# Patient Record
Sex: MALE | Race: WHITE | NOT HISPANIC OR LATINO
[De-identification: names, ages, dates, MRNs, and addresses within clinical notes are randomized per-mention and may not be internally consistent; named-entity substitution may affect disease eponyms.]

---

## 2020-05-14 ENCOUNTER — TRANSCRIPTION ENCOUNTER (OUTPATIENT)
Age: 55
End: 2020-05-14

## 2022-02-28 ENCOUNTER — NON-APPOINTMENT (OUTPATIENT)
Age: 57
End: 2022-02-28

## 2022-02-28 PROBLEM — Z00.00 ENCOUNTER FOR PREVENTIVE HEALTH EXAMINATION: Status: ACTIVE | Noted: 2022-02-28

## 2022-03-29 ENCOUNTER — NON-APPOINTMENT (OUTPATIENT)
Age: 57
End: 2022-03-29

## 2022-03-29 ENCOUNTER — APPOINTMENT (OUTPATIENT)
Dept: CARDIOLOGY | Facility: CLINIC | Age: 57
End: 2022-03-29
Payer: COMMERCIAL

## 2022-03-29 VITALS
HEIGHT: 70 IN | BODY MASS INDEX: 26.34 KG/M2 | OXYGEN SATURATION: 97 % | DIASTOLIC BLOOD PRESSURE: 70 MMHG | SYSTOLIC BLOOD PRESSURE: 120 MMHG | TEMPERATURE: 97.9 F | HEART RATE: 81 BPM | WEIGHT: 184 LBS

## 2022-03-29 DIAGNOSIS — R06.83 SNORING: ICD-10-CM

## 2022-03-29 PROCEDURE — 93000 ELECTROCARDIOGRAM COMPLETE: CPT

## 2022-03-29 PROCEDURE — 99203 OFFICE O/P NEW LOW 30 MIN: CPT

## 2022-03-29 RX ORDER — PSYLLIUM HUSK 0.4 G
CAPSULE ORAL
Refills: 0 | Status: ACTIVE | COMMUNITY

## 2022-03-29 RX ORDER — BIOTIN 10 MG
TABLET ORAL
Refills: 0 | Status: ACTIVE | COMMUNITY

## 2022-03-29 NOTE — DISCUSSION/SUMMARY
[With PCP] : with ~his/her~ primary care provider [FreeTextEntry1] : 57-year-old male with increasing dyspnea on exertion.  May be related to Covid pneumonia, myocarditis?  Will obtain echocardiogram for evaluation of left ventricular function and rule out pulmonary hypertension.  Also, risk stratification with exercise stress test.  Will obtain copies of most recent labs prior to testing from his primary care physician.  If elevated cholesterol, may require statin intervention.  Referred patient for pulmonary evaluation and sleep study, states he has seen Dr. Sabillon in the past.

## 2022-03-29 NOTE — HISTORY OF PRESENT ILLNESS
[FreeTextEntry1] : Generally healthy 57-year-old male with no significant past medical history.  Has been treated for hyperlipidemia in the past but is now off all medications.  He states that he came down with Covid pneumonia in early December, 2021 which was relatively mild but since then he has noticed progressively worsening dyspnea on minimal exertion.  He denies any chest pain, palpitations or syncopal episodes.  Works as an  and has noticed that when he goes up a flight of stairs he is quite winded.  He denies any cough, hemoptysis, chills or fever.  He does admit to significant issue of snoring.\par \par  with 3 children.  Nondrinker non-smoker.

## 2022-04-05 ENCOUNTER — APPOINTMENT (OUTPATIENT)
Dept: CARDIOLOGY | Facility: CLINIC | Age: 57
End: 2022-04-05
Payer: COMMERCIAL

## 2022-04-05 PROCEDURE — 93306 TTE W/DOPPLER COMPLETE: CPT

## 2023-03-14 ENCOUNTER — APPOINTMENT (OUTPATIENT)
Dept: CARDIOLOGY | Facility: CLINIC | Age: 58
End: 2023-03-14
Payer: COMMERCIAL

## 2023-03-14 ENCOUNTER — NON-APPOINTMENT (OUTPATIENT)
Age: 58
End: 2023-03-14

## 2023-03-14 VITALS
HEART RATE: 84 BPM | WEIGHT: 191 LBS | HEIGHT: 70 IN | BODY MASS INDEX: 27.35 KG/M2 | SYSTOLIC BLOOD PRESSURE: 120 MMHG | OXYGEN SATURATION: 98 % | TEMPERATURE: 98 F | DIASTOLIC BLOOD PRESSURE: 60 MMHG

## 2023-03-14 DIAGNOSIS — R12 HEARTBURN: ICD-10-CM

## 2023-03-14 PROCEDURE — 93000 ELECTROCARDIOGRAM COMPLETE: CPT

## 2023-03-14 PROCEDURE — 99214 OFFICE O/P EST MOD 30 MIN: CPT | Mod: 25

## 2023-03-14 RX ORDER — ATORVASTATIN CALCIUM 20 MG/1
20 TABLET, FILM COATED ORAL
Qty: 90 | Refills: 3 | Status: ACTIVE | COMMUNITY
Start: 2022-04-01 | End: 1900-01-01

## 2023-03-14 NOTE — HISTORY OF PRESENT ILLNESS
[FreeTextEntry1] : 58-year-old male with h/o hyperlipidemia in the past but has been somewhat non-compliant with meds.\ochoa Ryanochoa At his last visit, he stated that he had worsening dyspnea on minimal exertion since [id number] down with Covid pneumonia 3 months prior to his visit. He was scheduled to have a stress test (but couldn't find the time) and feels that his KENYON eventually resolved. Now he notices a heartburn like sensation after eating. He says he still has limited exercise tolerance in general. He denies any overt chest pain, palpitations or syncopal episodes.  \ochoa spear Works as an  and has noticed that when he goes up a flight of stairs he is quite winded.  He denies any cough, hemoptysis, chills or fever.  He does admit to significant issue of snoring and was given a CPAP device from Dr. Sabillon, but never even opened the box.\ochoa beatris  with 3 children.  Nondrinker non-smoker.

## 2023-03-14 NOTE — DISCUSSION/SUMMARY
[With PCP] : with ~his/her~ primary care provider [FreeTextEntry1] : 58-year-old male with continued dyspnea on exertion and heartburn like symptoms. Scheduled for risk stratification with exercise nuclear stress test.  No recent labs, off statin; lengthy discussion regarding need for compliance - will send labs .\par \par Continue f/u with pulmonary; spoke with patient about benefits of CPAp therapy.\par If cardiac eval negative, should be seen by GI.\par Advised trying antacids for heartburn symptoms. [EKG obtained to assist in diagnosis and management of assessed problem(s)] : EKG obtained to assist in diagnosis and management of assessed problem(s)

## 2023-03-14 NOTE — CARDIOLOGY SUMMARY
[de-identified] : 3/14/23, NSR\par 3/29/2022, normal sinus rhythm. [de-identified] : 4/5/22, EF 59%, no significant pathology

## 2023-03-27 LAB
ALBUMIN SERPL ELPH-MCNC: 4.9 G/DL
ALP BLD-CCNC: 72 U/L
ALT SERPL-CCNC: 27 U/L
ANION GAP SERPL CALC-SCNC: 13 MMOL/L
AST SERPL-CCNC: 25 U/L
BASOPHILS # BLD AUTO: 0.09 K/UL
BASOPHILS NFR BLD AUTO: 1 %
BILIRUB SERPL-MCNC: 0.7 MG/DL
BUN SERPL-MCNC: 27 MG/DL
CALCIUM SERPL-MCNC: 10.2 MG/DL
CHLORIDE SERPL-SCNC: 102 MMOL/L
CHOLEST SERPL-MCNC: 171 MG/DL
CO2 SERPL-SCNC: 27 MMOL/L
CREAT SERPL-MCNC: 1.2 MG/DL
EGFR: 70 ML/MIN/1.73M2
EOSINOPHIL # BLD AUTO: 0.12 K/UL
EOSINOPHIL NFR BLD AUTO: 1.4 %
GLUCOSE SERPL-MCNC: 80 MG/DL
HCT VFR BLD CALC: 43.8 %
HDLC SERPL-MCNC: 52 MG/DL
HGB BLD-MCNC: 14 G/DL
IMM GRANULOCYTES NFR BLD AUTO: 0.2 %
LDLC SERPL CALC-MCNC: 100 MG/DL
LYMPHOCYTES # BLD AUTO: 2.2 K/UL
LYMPHOCYTES NFR BLD AUTO: 25.2 %
MAN DIFF?: NORMAL
MCHC RBC-ENTMCNC: 27.1 PG
MCHC RBC-ENTMCNC: 32 GM/DL
MCV RBC AUTO: 84.9 FL
MONOCYTES # BLD AUTO: 0.73 K/UL
MONOCYTES NFR BLD AUTO: 8.4 %
NEUTROPHILS # BLD AUTO: 5.57 K/UL
NEUTROPHILS NFR BLD AUTO: 63.8 %
NONHDLC SERPL-MCNC: 119 MG/DL
PLATELET # BLD AUTO: 227 K/UL
POTASSIUM SERPL-SCNC: 4.1 MMOL/L
PROT SERPL-MCNC: 6.9 G/DL
RBC # BLD: 5.16 M/UL
RBC # FLD: 13.5 %
SODIUM SERPL-SCNC: 142 MMOL/L
TRIGL SERPL-MCNC: 95 MG/DL
TSH SERPL-ACNC: 0.71 UIU/ML
WBC # FLD AUTO: 8.73 K/UL

## 2023-03-28 LAB
ESTIMATED AVERAGE GLUCOSE: 117 MG/DL
HBA1C MFR BLD HPLC: 5.7 %

## 2023-05-10 ENCOUNTER — APPOINTMENT (OUTPATIENT)
Dept: CARDIOLOGY | Facility: CLINIC | Age: 58
End: 2023-05-10
Payer: COMMERCIAL

## 2023-05-10 ENCOUNTER — TRANSCRIPTION ENCOUNTER (OUTPATIENT)
Age: 58
End: 2023-05-10

## 2023-05-10 DIAGNOSIS — E78.5 HYPERLIPIDEMIA, UNSPECIFIED: ICD-10-CM

## 2023-05-10 DIAGNOSIS — R93.89 ABNORMAL FINDINGS ON DIAGNOSTIC IMAGING OF OTHER SPECIFIED BODY STRUCTURES: ICD-10-CM

## 2023-05-10 DIAGNOSIS — R06.09 OTHER FORMS OF DYSPNEA: ICD-10-CM

## 2023-05-10 PROCEDURE — 93015 CV STRESS TEST SUPVJ I&R: CPT

## 2023-05-10 PROCEDURE — A9500: CPT

## 2023-05-10 PROCEDURE — 78452 HT MUSCLE IMAGE SPECT MULT: CPT

## 2023-05-11 PROBLEM — R06.09 DYSPNEA ON MINIMAL EXERTION: Status: ACTIVE | Noted: 2022-03-29

## 2023-05-11 PROBLEM — R93.89 ABNORMAL FINDINGS ON IMAGING TEST: Status: ACTIVE | Noted: 2023-05-11

## 2023-05-11 PROBLEM — E78.5 HYPERLIPIDEMIA, MILD: Status: ACTIVE | Noted: 2022-03-29

## 2023-09-19 ENCOUNTER — APPOINTMENT (OUTPATIENT)
Dept: CARDIOLOGY | Facility: CLINIC | Age: 58
End: 2023-09-19